# Patient Record
Sex: FEMALE | URBAN - METROPOLITAN AREA
[De-identification: names, ages, dates, MRNs, and addresses within clinical notes are randomized per-mention and may not be internally consistent; named-entity substitution may affect disease eponyms.]

---

## 2023-09-06 ENCOUNTER — TELEPHONE (OUTPATIENT)
Age: 63
End: 2023-09-06

## 2023-09-06 NOTE — TELEPHONE ENCOUNTER
----- Message from [address] [place] [address] sent at 9/6/2023  9:13 AM EDT -----  Subject: Appointment Request    Reason for Call: New Patient/New to Provider Appointment needed: New   Patient Request Appointment    QUESTIONS    Reason for appointment request? No appointments available during search     Additional Information for Provider?  Pt would like to est care with Dr. Heidi Connolly, she would like to get put on a weight list. Please call emmett Kilpatrick.  ---------------------------------------------------------------------------  --------------  Jillian [place]  [phone]; OK to leave message on voicemail  ---------------------------------------------------------------------------  --------------  SCRIPT ANSWERS

## 2023-09-06 NOTE — TELEPHONE ENCOUNTER
Spoke to patient's  and let him know that Dr. Marcos Ford is not taking on new patients at the moment. I let the patient's  know that there are other providers to schedule with. Patient declined.

## 2023-10-27 PROBLEM — Z76.89 ENCOUNTER TO ESTABLISH CARE: Status: ACTIVE | Noted: 2023-10-27

## 2023-11-06 ENCOUNTER — HOSPITAL ENCOUNTER (EMERGENCY)
Facility: HOSPITAL | Age: 63
Discharge: HOME | End: 2023-11-06
Attending: EMERGENCY MEDICINE
Payer: COMMERCIAL

## 2023-11-06 VITALS
WEIGHT: 130 LBS | RESPIRATION RATE: 16 BRPM | OXYGEN SATURATION: 97 % | DIASTOLIC BLOOD PRESSURE: 75 MMHG | HEIGHT: 68 IN | TEMPERATURE: 98.2 F | SYSTOLIC BLOOD PRESSURE: 169 MMHG | BODY MASS INDEX: 19.7 KG/M2 | HEART RATE: 93 BPM

## 2023-11-06 DIAGNOSIS — S61.411A LACERATION OF RIGHT HAND WITHOUT FOREIGN BODY, INITIAL ENCOUNTER: Primary | ICD-10-CM

## 2023-11-06 PROCEDURE — 99283 EMERGENCY DEPT VISIT LOW MDM: CPT

## 2023-11-06 PROCEDURE — 2500000003 HC RX 250 WO HCPCS: Performed by: PHYSICIAN ASSISTANT

## 2023-11-06 PROCEDURE — 12001 RPR S/N/AX/GEN/TRNK 2.5CM/<: CPT

## 2023-11-06 PROCEDURE — 6370000000 HC RX 637 (ALT 250 FOR IP): Performed by: PHYSICIAN ASSISTANT

## 2023-11-06 RX ORDER — PREDNISONE 2.5 MG/1
2.5 TABLET ORAL DAILY PRN
COMMUNITY
Start: 2023-10-04

## 2023-11-06 RX ORDER — NITROFURANTOIN 25; 75 MG/1; MG/1
CAPSULE ORAL
COMMUNITY
Start: 2023-09-25

## 2023-11-06 RX ORDER — GINSENG 100 MG
CAPSULE ORAL ONCE
Status: COMPLETED | OUTPATIENT
Start: 2023-11-06 | End: 2023-11-06

## 2023-11-06 RX ORDER — LIDOCAINE HYDROCHLORIDE 10 MG/ML
5 INJECTION, SOLUTION EPIDURAL; INFILTRATION; INTRACAUDAL; PERINEURAL
Status: COMPLETED | OUTPATIENT
Start: 2023-11-06 | End: 2023-11-06

## 2023-11-06 RX ORDER — LIOTHYRONINE SODIUM 25 UG/1
TABLET ORAL
COMMUNITY
Start: 2023-08-26

## 2023-11-06 RX ORDER — ESZOPICLONE 3 MG
TABLET ORAL
COMMUNITY
Start: 2023-08-23

## 2023-11-06 RX ORDER — ESTRADIOL 0.1 MG/D
FILM, EXTENDED RELEASE TRANSDERMAL
COMMUNITY
Start: 2023-09-25

## 2023-11-06 RX ORDER — SULFAMETHOXAZOLE AND TRIMETHOPRIM 800; 160 MG/1; MG/1
TABLET ORAL
COMMUNITY
Start: 2023-10-31

## 2023-11-06 RX ORDER — ESTRADIOL 10 UG/1
TABLET VAGINAL
COMMUNITY
Start: 2023-10-24

## 2023-11-06 RX ORDER — HYDROCORTISONE 20 MG/1
TABLET ORAL
COMMUNITY
Start: 2023-09-25

## 2023-11-06 RX ORDER — GRANULES FOR ORAL 3 G/1
POWDER ORAL
COMMUNITY
Start: 2023-09-22

## 2023-11-06 RX ORDER — PREDNISONE 5 MG/1
5 TABLET ORAL 3 TIMES DAILY PRN
COMMUNITY
Start: 2023-10-04

## 2023-11-06 RX ORDER — FLUCONAZOLE 200 MG/1
TABLET ORAL
COMMUNITY
Start: 2023-09-25

## 2023-11-06 RX ORDER — TRAMADOL HYDROCHLORIDE 50 MG/1
TABLET ORAL
COMMUNITY
Start: 2023-10-26

## 2023-11-06 RX ADMIN — BACITRACIN: 500 OINTMENT TOPICAL at 19:50

## 2023-11-06 RX ADMIN — LIDOCAINE HYDROCHLORIDE 5 ML: 10 INJECTION, SOLUTION EPIDURAL; INFILTRATION; INTRACAUDAL; PERINEURAL at 19:16

## 2023-11-06 ASSESSMENT — ENCOUNTER SYMPTOMS
RHINORRHEA: 0
SORE THROAT: 0
VOMITING: 0
NAUSEA: 0
SHORTNESS OF BREATH: 0
DIARRHEA: 0
COUGH: 0
ABDOMINAL PAIN: 0

## 2023-11-06 ASSESSMENT — PAIN SCALES - GENERAL: PAINLEVEL_OUTOF10: 3

## 2023-11-06 ASSESSMENT — PAIN DESCRIPTION - ORIENTATION: ORIENTATION: RIGHT

## 2023-11-06 ASSESSMENT — PAIN DESCRIPTION - LOCATION: LOCATION: HAND

## 2023-11-06 ASSESSMENT — PAIN - FUNCTIONAL ASSESSMENT: PAIN_FUNCTIONAL_ASSESSMENT: 0-10

## 2023-11-07 NOTE — ED NOTES
Bacitracin applied to wound. Wound dressed using non adherent gauze and kurlix. Discharge instructions provided. Pt verbalized understanding. Opportunity provided for questions. Pt discharged home.       Samantha Fu RN  11/06/23 1956

## 2023-11-07 NOTE — DISCHARGE INSTRUCTIONS
Keep area clean and dry  Take Tylenol or Ibuprofen as needed for pain  Follow up with PCP or return to the ED for suture removal in 7-10 days  Continue to monitor symptoms, return if worsening pain, redness, fever    Discussed my clinical impression(s), any labs and/or radiology results with the patient. I answered any questions and addressed any concerns. Discussed the importance of following up with their primary care physician and/or specialist(s). Discussed signs or symptoms that would warrant return back to the ER for further evaluation. The patient is agreeable with discharge.

## 2023-11-07 NOTE — ED PROVIDER NOTES
DIFFERENTIAL DIAGNOSIS/MDM:   Vitals: There were no vitals filed for this visit. Medical Decision Making  Pt is a 59 yo F who presents to the ED for laceration to right palm s/p trying to open an incense bottle with a knife. Afebrile, vital signs stable. Physical exam shows two lacerations (0.5 cm and 1 cm) to right webspace between thumb and first finger    DDx: Laceration, foreign body    Plan:  - Medications: lidocaine, bacitracin, tetanus (declines)    Reassessment: Patient's wound has been successfully repaired (please see separate procedure note). They may safely be discharged at this time with return precautions for evidence of infection and return for suture removal in 7 to 10 days. Risk  OTC drugs. Prescription drug management.             REASSESSMENT            CONSULTS:  None    PROCEDURES:  Unless otherwise noted below, none     Lac Repair    Date/Time: 11/6/2023 7:05 PM    Performed by: Katie Echavarria PA-C  Authorized by: Severiano Kins, MD    Consent:     Consent obtained:  Verbal    Consent given by:  Patient    Risks, benefits, and alternatives were discussed: yes      Risks discussed:  Infection and pain  Universal protocol:     Patient identity confirmed:  Verbally with patient  Anesthesia:     Anesthesia method:  Local infiltration    Local anesthetic:  Lidocaine 1% w/o epi  Laceration details:     Location:  Hand    Hand location:  R palm    Length (cm):  1  Pre-procedure details:     Preparation:  Patient was prepped and draped in usual sterile fashion  Treatment:     Area cleansed with:  Saline    Amount of cleaning:  Standard    Irrigation solution:  Sterile saline    Irrigation method:  Syringe  Skin repair:     Repair method:  Sutures    Suture size:  5-0    Suture material:  Nylon    Number of sutures:  3  Approximation:     Approximation:  Close  Repair type:     Repair type:  Simple  Post-procedure details:     Dressing:  Antibiotic ointment and non-adherent dressing

## 2023-11-07 NOTE — ED TRIAGE NOTES
Pt reports she was trying to open something with a knife today that she could not get the plastic off of and she cut her R hand. Minimal bleeding noted to laceration upon arrival to triage. Pt reports her tetanus is not UTD but she chooses not to get tetanus shots.

## 2023-11-14 ENCOUNTER — TELEPHONE (OUTPATIENT)
Age: 63
End: 2023-11-14

## 2023-11-22 ENCOUNTER — HOSPITAL ENCOUNTER (EMERGENCY)
Facility: HOSPITAL | Age: 63
Discharge: HOME | End: 2023-11-22
Payer: COMMERCIAL

## 2023-11-22 VITALS
HEART RATE: 84 BPM | WEIGHT: 131.84 LBS | DIASTOLIC BLOOD PRESSURE: 54 MMHG | SYSTOLIC BLOOD PRESSURE: 122 MMHG | RESPIRATION RATE: 16 BRPM | HEIGHT: 68 IN | OXYGEN SATURATION: 97 % | TEMPERATURE: 97.6 F | BODY MASS INDEX: 19.98 KG/M2

## 2023-11-22 DIAGNOSIS — Z48.02 VISIT FOR SUTURE REMOVAL: Primary | ICD-10-CM

## 2023-11-22 PROCEDURE — 4500000002 HC ER NO CHARGE

## 2023-11-22 PROCEDURE — 6370000000 HC RX 637 (ALT 250 FOR IP): Performed by: NURSE PRACTITIONER

## 2023-11-22 PROCEDURE — 99283 EMERGENCY DEPT VISIT LOW MDM: CPT

## 2023-11-22 RX ADMIN — Medication 3 ML: at 12:18

## 2023-11-22 NOTE — ED TRIAGE NOTES
Triage: pt arrives to the ER unaccompanied for one stitch removal to right hand. Pt had laceration repair and removal of stitches ten days later from PCP but she was unable to get one. Denies any fever, redness or drainage at site.

## 2024-02-23 ENCOUNTER — TRANSCRIBE ORDERS (OUTPATIENT)
Facility: HOSPITAL | Age: 64
End: 2024-02-23

## 2024-02-23 DIAGNOSIS — H93.3X2 DISORDER OF LEFT ACOUSTIC NERVE: Primary | ICD-10-CM

## 2024-03-06 ENCOUNTER — TRANSCRIBE ORDERS (OUTPATIENT)
Facility: HOSPITAL | Age: 64
End: 2024-03-06

## 2024-03-06 DIAGNOSIS — R59.0 VIRCHOW'S NODE: Primary | ICD-10-CM

## 2024-03-07 ENCOUNTER — HOSPITAL ENCOUNTER (OUTPATIENT)
Facility: HOSPITAL | Age: 64
Discharge: HOME | End: 2024-03-07
Attending: STUDENT IN AN ORGANIZED HEALTH CARE EDUCATION/TRAINING PROGRAM
Payer: COMMERCIAL

## 2024-03-07 ENCOUNTER — APPOINTMENT (OUTPATIENT)
Facility: HOSPITAL | Age: 64
End: 2024-03-07
Payer: COMMERCIAL

## 2024-03-07 DIAGNOSIS — R59.0 VIRCHOW'S NODE: ICD-10-CM

## 2024-03-07 PROCEDURE — 70491 CT SOFT TISSUE NECK W/DYE: CPT

## 2024-03-07 PROCEDURE — 6360000004 HC RX CONTRAST MEDICATION: Performed by: STUDENT IN AN ORGANIZED HEALTH CARE EDUCATION/TRAINING PROGRAM

## 2024-03-07 RX ADMIN — IOPAMIDOL 100 ML: 612 INJECTION, SOLUTION INTRAVENOUS at 11:30

## 2024-03-26 ENCOUNTER — TELEPHONE (OUTPATIENT)
Age: 64
End: 2024-03-26

## 2024-03-26 ENCOUNTER — TRANSCRIBE ORDERS (OUTPATIENT)
Facility: HOSPITAL | Age: 64
End: 2024-03-26

## 2024-03-26 DIAGNOSIS — H93.3X2 DISORDER OF LEFT ACOUSTIC NERVE: Primary | ICD-10-CM

## 2024-03-26 DIAGNOSIS — E25.0 CONGENITAL ADRENOGENITAL DISORDERS ASSOCIATED WITH ENZYME DEFICIENCY (HCC): ICD-10-CM

## 2024-03-26 NOTE — TELEPHONE ENCOUNTER
Called and verified name and date of birth with patients  who Reports referral should be inbound via fax from primary care, primary dr erendira cole from [facility]. Did not find any faxes pertaining to such sent to provider to advise.

## 2024-04-07 ENCOUNTER — HOSPITAL ENCOUNTER (OUTPATIENT)
Facility: HOSPITAL | Age: 64
Discharge: HOME | End: 2024-04-10
Attending: INTERNAL MEDICINE
Payer: COMMERCIAL

## 2024-04-07 DIAGNOSIS — H93.3X2 DISORDER OF LEFT ACOUSTIC NERVE: ICD-10-CM

## 2024-04-07 DIAGNOSIS — E25.0 CONGENITAL ADRENOGENITAL DISORDERS ASSOCIATED WITH ENZYME DEFICIENCY (HCC): ICD-10-CM

## 2024-04-07 PROCEDURE — 70543 MRI ORBT/FAC/NCK W/O &W/DYE: CPT

## 2024-04-07 PROCEDURE — A9579 GAD-BASE MR CONTRAST NOS,1ML: HCPCS | Performed by: INTERNAL MEDICINE

## 2024-04-07 PROCEDURE — 6360000004 HC RX CONTRAST MEDICATION: Performed by: INTERNAL MEDICINE

## 2024-04-07 RX ADMIN — GADOTERIDOL 12 ML: 279.3 INJECTION, SOLUTION INTRAVENOUS at 15:03

## 2024-04-11 ENCOUNTER — TRANSCRIBE ORDERS (OUTPATIENT)
Facility: HOSPITAL | Age: 64
End: 2024-04-11

## 2024-04-11 DIAGNOSIS — D11.0 BENIGN TUMOR OF PAROTID GLAND: Primary | ICD-10-CM

## 2024-05-02 ENCOUNTER — HOSPITAL ENCOUNTER (OUTPATIENT)
Facility: HOSPITAL | Age: 64
End: 2024-05-02
Attending: INTERNAL MEDICINE
Payer: COMMERCIAL

## 2024-05-02 VITALS
RESPIRATION RATE: 20 BRPM | DIASTOLIC BLOOD PRESSURE: 67 MMHG | OXYGEN SATURATION: 98 % | HEART RATE: 78 BPM | TEMPERATURE: 98.2 F | SYSTOLIC BLOOD PRESSURE: 120 MMHG

## 2024-05-02 DIAGNOSIS — D11.0 BENIGN TUMOR OF PAROTID GLAND: ICD-10-CM

## 2024-05-02 PROCEDURE — 88305 TISSUE EXAM BY PATHOLOGIST: CPT

## 2024-05-02 PROCEDURE — 88172 CYTP DX EVAL FNA 1ST EA SITE: CPT

## 2024-05-02 PROCEDURE — 6360000002 HC RX W HCPCS: Performed by: RADIOLOGY

## 2024-05-02 PROCEDURE — 88173 CYTOPATH EVAL FNA REPORT: CPT

## 2024-05-02 PROCEDURE — 6360000002 HC RX W HCPCS: Performed by: NURSE PRACTITIONER

## 2024-05-02 PROCEDURE — 10005 FNA BX W/US GDN 1ST LES: CPT

## 2024-05-02 RX ORDER — FENTANYL CITRATE 50 UG/ML
100 INJECTION, SOLUTION INTRAMUSCULAR; INTRAVENOUS
Status: DISCONTINUED | OUTPATIENT
Start: 2024-05-02 | End: 2024-05-06 | Stop reason: HOSPADM

## 2024-05-02 RX ORDER — LIDOCAINE HYDROCHLORIDE 10 MG/ML
10 INJECTION, SOLUTION EPIDURAL; INFILTRATION; INTRACAUDAL; PERINEURAL ONCE
Status: DISCONTINUED | OUTPATIENT
Start: 2024-05-02 | End: 2024-05-06 | Stop reason: HOSPADM

## 2024-05-02 RX ORDER — FENTANYL CITRATE 50 UG/ML
INJECTION, SOLUTION INTRAMUSCULAR; INTRAVENOUS PRN
Status: COMPLETED | OUTPATIENT
Start: 2024-05-02 | End: 2024-05-02

## 2024-05-02 RX ORDER — MIDAZOLAM HYDROCHLORIDE 5 MG/5ML
5 INJECTION, SOLUTION INTRAMUSCULAR; INTRAVENOUS
Status: DISCONTINUED | OUTPATIENT
Start: 2024-05-02 | End: 2024-05-06 | Stop reason: HOSPADM

## 2024-05-02 RX ORDER — MIDAZOLAM HYDROCHLORIDE 5 MG/5ML
INJECTION, SOLUTION INTRAMUSCULAR; INTRAVENOUS PRN
Status: COMPLETED | OUTPATIENT
Start: 2024-05-02 | End: 2024-05-02

## 2024-05-02 RX ADMIN — FENTANYL CITRATE 25 MCG: 50 INJECTION INTRAMUSCULAR; INTRAVENOUS at 09:28

## 2024-05-02 RX ADMIN — FENTANYL CITRATE 25 MCG: 50 INJECTION INTRAMUSCULAR; INTRAVENOUS at 09:26

## 2024-05-02 RX ADMIN — MIDAZOLAM HYDROCHLORIDE 1 MG: 1 INJECTION, SOLUTION INTRAMUSCULAR; INTRAVENOUS at 09:29

## 2024-05-02 RX ADMIN — FENTANYL CITRATE 25 MCG: 50 INJECTION INTRAMUSCULAR; INTRAVENOUS at 09:30

## 2024-05-02 RX ADMIN — MIDAZOLAM HYDROCHLORIDE 1 MG: 1 INJECTION, SOLUTION INTRAMUSCULAR; INTRAVENOUS at 09:25

## 2024-05-02 RX ADMIN — MIDAZOLAM HYDROCHLORIDE 1 MG: 1 INJECTION, SOLUTION INTRAMUSCULAR; INTRAVENOUS at 09:32

## 2024-05-02 RX ADMIN — HYDROCORTISONE SODIUM SUCCINATE 50 MG: 100 INJECTION, POWDER, FOR SOLUTION INTRAMUSCULAR; INTRAVENOUS at 08:40

## 2024-05-02 RX ADMIN — MIDAZOLAM HYDROCHLORIDE 1 MG: 1 INJECTION, SOLUTION INTRAMUSCULAR; INTRAVENOUS at 09:27

## 2024-05-02 NOTE — PROGRESS NOTES
Patient ambulatory back to IR recovery at this time[facility] Patient is A&Ox4, on RA, and is in NAD at this time. Patient has no complaints of pain at this time. Call bell is within reach, bed locked, and lowered at this time. Patient awaiting to be consented for ordered procedure at this time. Belongings remain bedside in care of the patient.     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 Copy made for medical record and original document returned to patient. Discussed steroid stress dosing of steroids and antibiotic use. Patient is on antibiotic for recurrent UTI and is on stress dosing of her steroids currently.     Paperwork shown to nurse manager and NP Rico Tristan, told to scan into chart and NP will order medications as appropriate. Order for pre procedure 100 mg solu-cortef IV received.    Patient desires 50 mg solu-cortef, instead of 100 mg that is ordered.  Called LAURA tristan an he is okay with 50 mg one time dose solu-cortef IV.     Name of Procedure: image guided fine needle aspiration of the left parotid with moderate sedation     Sedation medications given: fentanyl and versed     Versed: 4 mg     Fentanyl:  75 mcg     Sedation Tolerated: well      Procedure and sedation times are the same.      Sedation Start: 0925  Sedation End: 0950     Vital Signs:  VSS throughout     Samples sent to lab: cytology present for duration of procedure, sufficient samples sent with cytology technician Memo     Any complications related to procedure: none identified at this time    1030 - Discharge instructions reviewed with patient, patient verbalizes understanding of education provided.  Opportunities given for questions and none at this time. Copy of AVS given to patient with radiology phone number included.  Patient is in NAD, on RA, and has no complaints of pain at this time. Patient  exhibiting baseline gait.  Discharged from X-ray recovery via wheelchair in stable condition. Pt released with family member via wheelchair.

## 2024-05-02 NOTE — H&P
INTERVENTIONAL RADIOLOGY  Preoperative History and Physical      Patient:  Amita Yee  :  1960  Age:  63 y.o.  MRN:  [id number]  Today's Date:  2024      CC / HPI   Amita Yee is a 63 y.o. female with a history of left parotid mass who presents for biopsy.    PAST MEDICAL HISTORY  No past medical history on file.    PAST SURGICAL HISTORY  No past surgical history on file.    SOCIAL HISTORY  Social History     Socioeconomic History    Marital status:      Spouse name: Not on file    Number of children: Not on file    Years of education: Not on file    Highest education level: Not on file   Occupational History    Not on file   Tobacco Use    Smoking status: Never    Smokeless tobacco: Never   Vaping Use    Vaping Use: Never used   Substance and Sexual Activity    Alcohol use: Not Currently    Drug use: Never    Sexual activity: Not Currently   Other Topics Concern    Not on file   Social History Narrative    Not on file     Social Determinants of Health     Financial Resource Strain: Not on file   Food Insecurity: Not on file   Transportation Needs: Not on file   Physical Activity: Not on file   Stress: Not on file   Social Connections: Not on file   Intimate Partner Violence: Not on file   Housing Stability: Not on file     FAMILY HISTORY  No family history on file.    CURRENT MEDICATIONS  Current Outpatient Medications   Medication Sig Dispense Refill    ESZOPICLONE 3 MG tablet       estradiol (VIVELLE) 0.1 MG/24HR       fosfomycin tromethamine (MONUROL) 3 g PACK       hydrocortisone (CORTEF) 20 MG tablet       CYTOMEL 25 MCG tablet       predniSONE (DELTASONE) 5 MG tablet Take 1 tablet by mouth 3 times daily as needed      predniSONE (DELTASONE) 2.5 MG tablet Take 1 tablet by mouth daily as needed      traMADol (ULTRAM) 50 MG tablet       YUVAFEM 10 MCG TABS vaginal tablet       fluconazole (DIFLUCAN) 200 MG tablet       nitrofurantoin, macrocrystal-monohydrate, (MACROBID) 100 MG capsule

## 2024-05-02 NOTE — DISCHARGE INSTRUCTIONS
[facility]  Radiology Department  [phone]    Radiologist: Marina Jc MD / Rico Marlow NP     Date: 5/2/2024        Sedation Discharge Instructions      Go home and rest and restrict your activity the next 24 hours.     You have been given sedating medications, so do not drive or drink alcohol today.     Resume your previous diet and medications.    You may return to work and resume normal activities tomorrow.      Results of your MRI will be sent to your physician as soon as they become available.

## 2024-05-14 ENCOUNTER — HOSPITAL ENCOUNTER (OUTPATIENT)
Facility: HOSPITAL | Age: 64
Discharge: HOME | End: 2024-05-17
Attending: INTERNAL MEDICINE
Payer: COMMERCIAL

## 2024-05-14 DIAGNOSIS — Z13.820 ENCOUNTER FOR SCREENING FOR OSTEOPOROSIS: ICD-10-CM

## 2024-05-14 DIAGNOSIS — N95.9 UNSPECIFIED MENOPAUSAL AND PERIMENOPAUSAL DISORDER: ICD-10-CM

## 2024-05-14 PROCEDURE — 77080 DXA BONE DENSITY AXIAL: CPT

## 2024-05-16 ENCOUNTER — HOSPITAL ENCOUNTER (OUTPATIENT)
Facility: HOSPITAL | Age: 64
Discharge: HOME | End: 2024-05-16
Payer: COMMERCIAL

## 2024-05-16 VITALS
RESPIRATION RATE: 22 BRPM | HEART RATE: 78 BPM | SYSTOLIC BLOOD PRESSURE: 138 MMHG | OXYGEN SATURATION: 96 % | DIASTOLIC BLOOD PRESSURE: 78 MMHG

## 2024-05-16 DIAGNOSIS — B99.9 INFECTION: ICD-10-CM

## 2024-05-16 PROCEDURE — 2500000003 HC RX 250 WO HCPCS: Performed by: STUDENT IN AN ORGANIZED HEALTH CARE EDUCATION/TRAINING PROGRAM

## 2024-05-16 PROCEDURE — 36005 INJECTION EXT VENOGRAPHY: CPT

## 2024-05-16 PROCEDURE — 6360000004 HC RX CONTRAST MEDICATION: Performed by: STUDENT IN AN ORGANIZED HEALTH CARE EDUCATION/TRAINING PROGRAM

## 2024-05-16 RX ORDER — HEPARIN SODIUM 200 [USP'U]/100ML
200 INJECTION, SOLUTION INTRAVENOUS ONCE
Status: DISCONTINUED | OUTPATIENT
Start: 2024-05-16 | End: 2024-05-20 | Stop reason: HOSPADM

## 2024-05-16 RX ORDER — LIDOCAINE HYDROCHLORIDE 20 MG/ML
20 INJECTION, SOLUTION INFILTRATION; PERINEURAL ONCE
Status: COMPLETED | OUTPATIENT
Start: 2024-05-16 | End: 2024-05-16

## 2024-05-16 RX ADMIN — LIDOCAINE HYDROCHLORIDE 5 ML: 20 INJECTION, SOLUTION INFILTRATION; PERINEURAL at 11:58

## 2024-05-16 RX ADMIN — IOPAMIDOL 15 ML: 755 INJECTION, SOLUTION INTRAVENOUS at 12:02

## 2024-05-16 NOTE — PROGRESS NOTES
Patient ambulatory back to IR recovery at this time. Family member in lobby. Patient is A&Ox4, on RA, and is in NAD at this time. Patient has no complaints of pain at this time. Call bell is within reach, bed locked, and lowered at this time. Patient awaiting to be consented for ordered procedure at this time.

## 2024-05-16 NOTE — DISCHARGE INSTRUCTIONS
[facility]  Department of Interventional Radiology/Special Procedures   [phone]      Radiologist: Ele Victoria MD / Lynsey Baptiste NP      Date:     5/16/2024    PICC Catheter Discharge Instructions    Your new PICC line is ready to be used.  Line placement has been verified by the  Radiologist under fluoroscopy.        PICC line brand/POWER:      The catheter length is:        Keep the dressing clean and dry.  Do not get it wet.  No showers.  Baths are OK.  Watch for signs of infection:    New or increasing pain around PICC line  Fever/Chills  Drainage or pus  New swelling or redness in arm    Please call your physician if you note any signs of infection.                                                                                                                                    You may take Tylenol, as directed on the label, for pain if allowed.     Resume previous diet and continue your prescribed medications.     Change the dressing every 3 days or any time the dressing becomes wet, soiled or loose.  If you have Home Healthcare they can assist you with the dressing change.      Please follow up with your doctor regarding further care and use of this catheter.

## 2024-05-16 NOTE — PROCEDURES
Brief Postoperative Note    Amita Yee  YOB: 1960  [id number]    Pre-operative Diagnosis: Chronic UTI, need for long term antibiotics     Post-operative Diagnosis: Same    Procedure: Right PICC placement     Anesthesia: Local    Surgeons/Assistants: Ele Victoria MD    Estimated Blood Loss: Minimal    Complications: None    Specimens: Was Not Obtained    Findings: Placement of a 5 [place] double lumen PICC via the patient right basilic vein. The PICC was cut to 43 cm and is positioned at the superior cavoatrial junction. The PICC is ready for immediate use.     Electronically signed by Ele Victoria MD on 5/16/2024 at 12:03 PM

## 2024-06-29 ENCOUNTER — HOSPITAL ENCOUNTER (OUTPATIENT)
Facility: HOSPITAL | Age: 64
End: 2024-06-29
Attending: INTERNAL MEDICINE
Payer: COMMERCIAL

## 2024-06-29 DIAGNOSIS — M48.061 SPINAL STENOSIS, LUMBAR REGION, WITHOUT NEUROGENIC CLAUDICATION: ICD-10-CM

## 2024-06-29 PROCEDURE — 72148 MRI LUMBAR SPINE W/O DYE: CPT

## 2024-07-24 ENCOUNTER — HOSPITAL ENCOUNTER (OUTPATIENT)
Facility: HOSPITAL | Age: 64
Discharge: HOME | End: 2024-07-24
Attending: RADIOLOGY | Admitting: RADIOLOGY
Payer: COMMERCIAL

## 2024-07-24 VITALS
HEART RATE: 77 BPM | SYSTOLIC BLOOD PRESSURE: 129 MMHG | RESPIRATION RATE: 16 BRPM | TEMPERATURE: 98 F | HEIGHT: 68 IN | OXYGEN SATURATION: 99 % | BODY MASS INDEX: 20.16 KG/M2 | WEIGHT: 133 LBS | DIASTOLIC BLOOD PRESSURE: 75 MMHG

## 2024-07-24 DIAGNOSIS — Z22.358 ESBL E. COLI CARRIER: ICD-10-CM

## 2024-07-24 PROCEDURE — 6360000004 HC RX CONTRAST MEDICATION: Performed by: NURSE PRACTITIONER

## 2024-07-24 PROCEDURE — 36573 INSJ PICC RS&I 5 YR+: CPT

## 2024-07-24 PROCEDURE — 2500000003 HC RX 250 WO HCPCS: Performed by: NURSE PRACTITIONER

## 2024-07-24 RX ORDER — LIDOCAINE HYDROCHLORIDE 10 MG/ML
INJECTION, SOLUTION EPIDURAL; INFILTRATION; INTRACAUDAL; PERINEURAL PRN
Status: DISCONTINUED | OUTPATIENT
Start: 2024-07-24 | End: 2024-07-24 | Stop reason: HOSPADM

## 2024-07-24 RX ORDER — LEVOTHYROXINE SODIUM 0.05 MG/1
50 TABLET ORAL DAILY
COMMUNITY

## 2024-07-24 RX ADMIN — IOPAMIDOL 3 ML: 755 INJECTION, SOLUTION INTRAVENOUS at 10:50

## 2024-07-24 RX ADMIN — LIDOCAINE HYDROCHLORIDE 5 ML: 10 INJECTION, SOLUTION EPIDURAL; INFILTRATION; INTRACAUDAL; PERINEURAL at 10:10

## 2024-07-24 NOTE — PROGRESS NOTES
Pt symptoms have resolved, pt still does not want to go to ED but agreed to call a cardiologist when she returns home.    Pt PICC information was given to [facility] at the request of pt .    Pt discharge instructions were given to pt by PA and RN. Opportunities for questions and concerns were provided. Pt verbalized understanding of medication use and follow-up. Pt wheeled off unit in no signs of distress, steady gait noted.     Extra PICC sleeves were also given to pt.

## 2024-07-24 NOTE — PROGRESS NOTES
Pt requesting saltine crackers. Snack and soda provided to pt per request, pt states her chest pain has resolved.     Pt still declines to go to ED, providers aware.    Pt to be monitored for additional time.

## 2024-07-24 NOTE — DISCHARGE INSTRUCTIONS
Peripherally Inserted Central Catheter (PICC): Care Instructions  Overview     A peripherally inserted central catheter (PICC) is a thin, flexible tube that's used to give medicine, blood products, nutrients, or fluids. One end is put through the skin into a vein in the arm and moved into a large vein near your heart. The other end stays outside your body. It is a type of central vascular access device, or central line. You may have it for weeks or months.  A PICC can be more comfortable for you because medicines and other fluids go directly into the catheter. So you will not be stuck with a needle every time. A PICC may be used to draw blood for tests only if another vein, such as in the hand or arm, can't be used. The end of the PICC sometimes has two or three openings so that you can get more than one type of fluid or medicine at a time.  Your doctor may give you medicine to make you feel relaxed. You may feel a little pain when your doctor numbs your arm. Your doctor will then thread the catheter up a vein in your arm to a larger vein. You will not feel any pain. The doctor may use stitches or other devices to hold the catheter in place where it exits your arm.  After the procedure, the site may be sore for a day or two. You may have a large bandage or other covering to help keep the PICC clean and in place.  Follow-up care is a key part of your treatment and safety. Be sure to make and go to all appointments, and call your doctor if you are having problems. It's also a good idea to know your test results and keep a list of the medicines you take.  How can you care for yourself at home?  Be careful wearing jewelry, such as necklaces, that can catch on the catheter.  If the catheter breaks, follow the instructions your doctor gave you. If you have no instructions, clamp or tie off the catheter. Then see a doctor as soon as possible.  To help prevent infection, take a shower instead of a bath. Do not go

## 2024-07-24 NOTE — PROGRESS NOTES
Pt complaining of chest pain at end of procedure. Providers made aware. Pt states she \"has had this in the past when I'm on steroids\". Pt explaining she has GERD but has never seen a GI doctor or been diagnosed with GERD. Pt has been recommended to follow up with cardiology in the past but she states \"I never went, I have too many doctors\".    Providers made aware again of pt 10:10 chest pain down to 5:10 after small sips of Ginger Ale.    Providers recommend pt go to ED at [facility] for further evaluation. Pt refusing. Providers aware.    Pt denies, SOB, nausea or other issues. Pt had no EKG changes, remained in NSR throughout procedure and event.

## 2024-11-17 ENCOUNTER — HOSPITAL ENCOUNTER (OUTPATIENT)
Facility: HOSPITAL | Age: 64
Discharge: HOME | End: 2024-11-20
Attending: OBSTETRICS & GYNECOLOGY
Payer: COMMERCIAL

## 2024-11-17 DIAGNOSIS — E27.1 ADDISON MELANODERMA (HCC): ICD-10-CM

## 2024-11-17 DIAGNOSIS — R10.2 PELVIC AND PERINEAL PAIN: ICD-10-CM

## 2024-11-17 PROCEDURE — 74181 MRI ABDOMEN W/O CONTRAST: CPT

## 2024-11-17 PROCEDURE — 72195 MRI PELVIS W/O DYE: CPT
